# Patient Record
Sex: FEMALE | Race: WHITE | Employment: UNEMPLOYED | URBAN - METROPOLITAN AREA
[De-identification: names, ages, dates, MRNs, and addresses within clinical notes are randomized per-mention and may not be internally consistent; named-entity substitution may affect disease eponyms.]

---

## 2021-08-09 ENCOUNTER — HOSPITAL ENCOUNTER (EMERGENCY)
Age: 51
Discharge: HOME | End: 2021-08-09
Attending: STUDENT IN AN ORGANIZED HEALTH CARE EDUCATION/TRAINING PROGRAM
Payer: MEDICAID

## 2021-08-09 ENCOUNTER — APPOINTMENT (OUTPATIENT)
Dept: GENERAL RADIOLOGY | Age: 51
End: 2021-08-09
Attending: STUDENT IN AN ORGANIZED HEALTH CARE EDUCATION/TRAINING PROGRAM
Payer: MEDICAID

## 2021-08-09 VITALS
RESPIRATION RATE: 28 BRPM | DIASTOLIC BLOOD PRESSURE: 79 MMHG | HEART RATE: 93 BPM | SYSTOLIC BLOOD PRESSURE: 152 MMHG | TEMPERATURE: 98 F | OXYGEN SATURATION: 99 %

## 2021-08-09 DIAGNOSIS — F41.1 ANXIETY STATE: Primary | ICD-10-CM

## 2021-08-09 LAB
ATRIAL RATE: 92 BPM
BASOPHILS # BLD: 0.1 K/UL (ref 0–0.1)
BASOPHILS NFR BLD: 1 % (ref 0–1)
CALCULATED P AXIS, ECG09: 63 DEGREES
CALCULATED R AXIS, ECG10: 25 DEGREES
CALCULATED T AXIS, ECG11: 37 DEGREES
DIAGNOSIS, 93000: NORMAL
DIFFERENTIAL METHOD BLD: ABNORMAL
EOSINOPHIL # BLD: 0 K/UL (ref 0–0.4)
EOSINOPHIL NFR BLD: 0 % (ref 0–7)
ERYTHROCYTE [DISTWIDTH] IN BLOOD BY AUTOMATED COUNT: 14.5 % (ref 11.5–14.5)
HCT VFR BLD AUTO: 38.6 % (ref 35–47)
HGB BLD-MCNC: 13.2 G/DL (ref 11.5–16)
IMM GRANULOCYTES # BLD AUTO: 0 K/UL (ref 0–0.04)
IMM GRANULOCYTES NFR BLD AUTO: 0 % (ref 0–0.5)
LYMPHOCYTES # BLD: 2.6 K/UL (ref 0.8–3.5)
LYMPHOCYTES NFR BLD: 20 % (ref 12–49)
MCH RBC QN AUTO: 32 PG (ref 26–34)
MCHC RBC AUTO-ENTMCNC: 34.2 G/DL (ref 30–36.5)
MCV RBC AUTO: 93.5 FL (ref 80–99)
MONOCYTES # BLD: 0.7 K/UL (ref 0–1)
MONOCYTES NFR BLD: 6 % (ref 5–13)
NEUTS SEG # BLD: 9.2 K/UL (ref 1.8–8)
NEUTS SEG NFR BLD: 73 % (ref 32–75)
NRBC # BLD: 0 K/UL (ref 0–0.01)
NRBC BLD-RTO: 0 PER 100 WBC
P-R INTERVAL, ECG05: 146 MS
PLATELET # BLD AUTO: 186 K/UL (ref 150–400)
PMV BLD AUTO: 10.8 FL (ref 8.9–12.9)
Q-T INTERVAL, ECG07: 374 MS
QRS DURATION, ECG06: 80 MS
QTC CALCULATION (BEZET), ECG08: 462 MS
RBC # BLD AUTO: 4.13 M/UL (ref 3.8–5.2)
VENTRICULAR RATE, ECG03: 92 BPM
WBC # BLD AUTO: 12.6 K/UL (ref 3.6–11)

## 2021-08-09 PROCEDURE — 71045 X-RAY EXAM CHEST 1 VIEW: CPT

## 2021-08-09 PROCEDURE — 93005 ELECTROCARDIOGRAM TRACING: CPT

## 2021-08-09 PROCEDURE — 85025 COMPLETE CBC W/AUTO DIFF WBC: CPT

## 2021-08-09 PROCEDURE — 99284 EMERGENCY DEPT VISIT MOD MDM: CPT

## 2021-08-09 PROCEDURE — 74011250637 HC RX REV CODE- 250/637: Performed by: STUDENT IN AN ORGANIZED HEALTH CARE EDUCATION/TRAINING PROGRAM

## 2021-08-09 PROCEDURE — 36415 COLL VENOUS BLD VENIPUNCTURE: CPT

## 2021-08-09 RX ORDER — SERTRALINE HYDROCHLORIDE 100 MG/1
100 TABLET, FILM COATED ORAL DAILY
Qty: 14 TABLET | Refills: 0 | Status: SHIPPED | OUTPATIENT
Start: 2021-08-09 | End: 2021-08-23

## 2021-08-09 RX ORDER — BENZONATATE 100 MG/1
100 CAPSULE ORAL ONCE
Status: COMPLETED | OUTPATIENT
Start: 2021-08-09 | End: 2021-08-09

## 2021-08-09 RX ORDER — HYDROXYZINE 50 MG/1
50 TABLET, FILM COATED ORAL
Qty: 30 TABLET | Refills: 0 | Status: SHIPPED | OUTPATIENT
Start: 2021-08-09 | End: 2021-08-23

## 2021-08-09 RX ORDER — LORAZEPAM 1 MG/1
1 TABLET ORAL
Status: COMPLETED | OUTPATIENT
Start: 2021-08-09 | End: 2021-08-09

## 2021-08-09 RX ORDER — QUETIAPINE FUMARATE 25 MG/1
25 TABLET, FILM COATED ORAL
Qty: 14 TABLET | Refills: 0 | Status: SHIPPED | OUTPATIENT
Start: 2021-08-09 | End: 2021-08-23

## 2021-08-09 RX ADMIN — BENZONATATE 100 MG: 100 CAPSULE ORAL at 15:53

## 2021-08-09 RX ADMIN — LORAZEPAM 1 MG: 1 TABLET ORAL at 15:53

## 2021-08-09 NOTE — DISCHARGE INSTRUCTIONS
You presented to the ED after running out of your psychiatric medications several months ago. You ran out of Zoloft about 1 month ago. You are working on following up closely with psychiatric care. I have prescribed a bridge of medications that I feel comfortable prescribing for your symptoms including hydroxyzine and Seroquel and Zoloft. Please follow-up as quickly as possible with your psychiatric care provider. Read below for substance abuse recommendations for alcohol use.

## 2021-08-09 NOTE — ED NOTES
The pt is discharged home with follow-up instructions. The pt received three prescriptions and verbalizes understanding of the discharge instructions.

## 2021-08-09 NOTE — ED NOTES
Pt refusing redraw at this time for lab work. No longer wants IV and requested it be d/c'd.  Reported to provider

## 2021-08-09 NOTE — ED NOTES
Pt acting erratic when attempting to triage. Refusing to sit down, repeatedly trying to call boyfriend on the phone, will not answer questions or be redirected, patient obsessing over calling boyfriend. Patient is alert, speaking in clear sentences, no actue distress noted.  Reported findings to provider

## 2021-08-09 NOTE — ED PROVIDER NOTES
Patient is a 30-year-old female presenting ED with concerns for mental health. Patient states she takes multiple psychiatric medications but unable to fill them recently due to loss of insurance and significant issues with [facility]. Patient denies HI or SI but states she is having problems with her mental health. Patient says she has a history of bipolar, depression. Endorsing anxiety attack symptoms at this time. Vital signs stable, patient afebrile. The history is provided by the patient and medical records. Anxiety   This is a chronic problem. The current episode started more than 1 week ago. The problem has been gradually worsening. The problem occurs constantly. Associated with: Loss of access to medications, refills. The pain is present in the substernal region. The pain is at a severity of 0/10. The patient is experiencing no pain. The quality of the pain is described as pressure-like. The pain does not radiate. The symptoms are aggravated by stress. Associated symptoms include malaise/fatigue. Pertinent negatives include no abdominal pain, no claudication, no diaphoresis, no dizziness, no exertional chest pressure, no fever, no nausea, no shortness of breath, no vomiting and no weakness. She has tried nothing for the symptoms. Risk factors include no risk factors. No risk factors for CAD       Past Medical History:   Diagnosis Date    Chronic pain     GERD (gastroesophageal reflux disease)     Migraines     Pancreatitis     Panic attacks        Past Surgical History:   Procedure Laterality Date    HX CHOLECYSTECTOMY           History reviewed. No pertinent family history.     Social History     Socioeconomic History    Marital status: SINGLE     Spouse name: Not on file    Number of children: Not on file    Years of education: Not on file    Highest education level: Not on file   Occupational History    Not on file   Tobacco Use    Smoking status: Not on file Substance and Sexual Activity    Alcohol use: Not on file    Drug use: Not on file    Sexual activity: Not on file   Other Topics Concern    Not on file   Social History Narrative    Not on file     Social Determinants of Health     Financial Resource Strain:     Difficulty of Paying Living Expenses:    Food Insecurity:     Worried About Running Out of Food in the Last Year:     [address] in the Last Year:    Transportation Needs:     Lack of Transportation (Medical):  Lack of Transportation (Non-Medical):    Physical Activity:     Days of Exercise per Week:     Minutes of Exercise per Session:    Stress:     Feeling of Stress :    Social Connections:     Frequency of Communication with Friends and Family:     Frequency of Social Gatherings with Friends and Family:     Attends [employer] Services:     Active Member of Clubs or Organizations:     Attends Club or Organization Meetings:     Marital Status:    Intimate Partner Violence:     Fear of Current or Ex-Partner:     Emotionally Abused:     Physically Abused:     Sexually Abused: ALLERGIES: Depakote [divalproex], Ephedrine, and Prednisone    Review of Systems   Constitutional: Positive for malaise/fatigue. Negative for diaphoresis and fever. HENT: Negative. Eyes: Negative. Respiratory: Negative. Negative for shortness of breath. Cardiovascular: Negative. Negative for claudication. Gastrointestinal: Negative. Negative for abdominal pain, nausea and vomiting. Endocrine: Negative. Genitourinary: Negative. Musculoskeletal: Negative. Skin: Negative. Allergic/Immunologic: Negative. Neurological: Negative. Negative for dizziness and weakness. Hematological: Negative. Psychiatric/Behavioral: Negative for agitation, behavioral problems, confusion, self-injury and suicidal ideas. The patient is nervous/anxious.         Vitals:    08/09/21 1426   BP: (!) 152/79   Pulse: 93   Resp: 28   Temp: 98 °F (36.7 °C)   SpO2: 99%            Physical Exam  Vitals and nursing note reviewed. Constitutional:       General: She is not in acute distress. Appearance: Normal appearance. HENT:      Head: Normocephalic and atraumatic. Right Ear: External ear normal.      Left Ear: External ear normal.      Nose: Nose normal.      Mouth/Throat:      Mouth: Mucous membranes are moist.      Pharynx: Oropharynx is clear. No posterior oropharyngeal erythema. Eyes:      Extraocular Movements: Extraocular movements intact. Conjunctiva/sclera: Conjunctivae normal.   Cardiovascular:      Rate and Rhythm: Normal rate. Pulses: Normal pulses. Heart sounds: Normal heart sounds. Pulmonary:      Effort: Pulmonary effort is normal.      Breath sounds: Normal breath sounds. Chest:      Chest wall: No deformity or tenderness. Abdominal:      General: Abdomen is flat. There is no distension. Tenderness: There is no abdominal tenderness. Musculoskeletal:         General: No deformity or signs of injury. Normal range of motion. Cervical back: Normal range of motion and neck supple. No tenderness. Skin:     General: Skin is warm and dry. Capillary Refill: Capillary refill takes less than 2 seconds. Neurological:      General: No focal deficit present. Mental Status: She is alert and oriented to person, place, and time.    Psychiatric:         Mood and Affect: Mood normal.         Behavior: Behavior normal.          MDM  Number of Diagnoses or Management Options  Anxiety state: established and worsening     Amount and/or Complexity of Data Reviewed  Clinical lab tests: ordered and reviewed  Tests in the radiology section of CPT®: ordered and reviewed  Tests in the medicine section of CPT®: ordered and reviewed  Review and summarize past medical records: yes    Risk of Complications, Morbidity, and/or Mortality  Presenting problems: moderate  Diagnostic procedures: moderate  Management options: moderate    Patient Progress  Patient progress: improved         LABORATORY RESULTS:  Labs Reviewed   CBC WITH AUTOMATED DIFF - Abnormal; Notable for the following components:       Result Value    WBC 12.6 (*)     ABS. NEUTROPHILS 9.2 (*)     All other components within normal limits       IMAGING RESULTS:  XR CHEST PORT   Final Result   1. No acute process. MEDICATIONS GIVEN:  Medications   LORazepam (ATIVAN) tablet 1 mg (1 mg SubLINGual Given 8/9/21 1553)   benzonatate (TESSALON) capsule 100 mg (100 mg Oral Given 8/9/21 1553)       Differential diagnosis: Anxiety, noncardiac chest pain, loss of access to medications, poor access to medical treatment. ED physician interpretation of imaging: Chest x-ray hemopneumothorax, wide mediastinum or focal pneumonia  ED physician interpretation of EKG: No STEMI. Rhythm: normal sinus rhythm; and regular . Rate (approx.): 78; EKG documented and interpreted by Tyler Torrez MD  ED physician interpretation of laboratory results: CBC without signs of anemia. Additional labs ordered but hemolyzed and canceled by lab. Patient felt much better after treatment did not want to stay for further lab work. Doubt ACS in this patient with low risk factors, normal EKG, resolved symptoms and history of anxiety. MDM: Patient is a 81-year-old female with a history of significant mental health disorder including bipolar, anxiety has been out of medications for a few months presents to the ED with concern for her mental status and also her relationship with a new partner. Patient symptoms improved with Ativan, conversation. Patient's EKG and CBC unremarkable. Further lab work offered the patient states she feels better. Most recently prescribed medications were refilled for 14 days. Patient says she has an appointment with [facility] on Thursday. Patient denies SI or HI.   Patient discharged in the ED with significant other who appears supportive and truly wants to help the patient. Patient happy to leave and appreciative of short courses of psychiatric medications. Key discharge instructions: You presented to the ED after running out of your psychiatric medications several months ago. You ran out of Zoloft about 1 month ago. You are working on following up closely with psychiatric care. I have prescribed a bridge of medications that I feel comfortable prescribing for your symptoms including hydroxyzine and Seroquel and Zoloft. Please follow-up as quickly as possible with your psychiatric care provider. Read below for substance abuse recommendations for alcohol use. Local substance abuse resources provided in discharge paperwork. The patient has been re-evaluated and feeling much better and are stable for discharge. All available radiology and laboratory results have been reviewed with patient and/or available family. Patient and/or family verbally conveyed their understanding and agreement of the patient's signs, symptoms, diagnosis, treatment and prognosis and additionally agree to follow-up as recommended in the discharge instructions or to return to the Emergency Department should their condition change or worsen prior to their follow-up appointment. All questions have been answered and patient and/or available family express understanding. IMPRESSION:  1. Anxiety state        DISPOSITION: Discharged    Cheo Nolen MD        Procedures
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
## 2023-05-12 RX ORDER — NICOTINE 21 MG/24HR
1 PATCH, TRANSDERMAL 24 HOURS TRANSDERMAL EVERY 24 HOURS
COMMUNITY